# Patient Record
Sex: MALE | Race: WHITE | URBAN - METROPOLITAN AREA
[De-identification: names, ages, dates, MRNs, and addresses within clinical notes are randomized per-mention and may not be internally consistent; named-entity substitution may affect disease eponyms.]

---
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
## 2024-09-23 DIAGNOSIS — S66.811A STRAIN OF OTHER SPECIFIED MUSCLES, FASCIA AND TENDONS AT WRIST AND HAND LEVEL, RIGHT HAND, INITIAL ENCOUNTER: Primary | ICD-10-CM

## 2024-09-25 ENCOUNTER — OFFICE VISIT (OUTPATIENT)
Dept: ORTHOPEDICS | Facility: CLINIC | Age: 24
End: 2024-09-25
Payer: MEDICAID

## 2024-09-25 ENCOUNTER — HOSPITAL ENCOUNTER (OUTPATIENT)
Dept: RADIOLOGY | Facility: HOSPITAL | Age: 24
Discharge: HOME | End: 2024-09-25
Attending: ORTHOPAEDIC SURGERY
Payer: MEDICAID

## 2024-09-25 VITALS
HEIGHT: 67 IN | SYSTOLIC BLOOD PRESSURE: 108 MMHG | HEART RATE: 70 BPM | DIASTOLIC BLOOD PRESSURE: 66 MMHG | TEMPERATURE: 98 F

## 2024-09-25 DIAGNOSIS — M79.641 RIGHT HAND PAIN: ICD-10-CM

## 2024-09-25 DIAGNOSIS — S63.639A JERSEY FINGER, INITIAL ENCOUNTER: ICD-10-CM

## 2024-09-25 DIAGNOSIS — M79.641 RIGHT HAND PAIN: Primary | ICD-10-CM

## 2024-09-25 DIAGNOSIS — S66.811A STRAIN OF OTHER SPECIFIED MUSCLES, FASCIA AND TENDONS AT WRIST AND HAND LEVEL, RIGHT HAND, INITIAL ENCOUNTER: ICD-10-CM

## 2024-09-25 DIAGNOSIS — S66.194A OTHER INJURY OF FLEXOR MUSCLE, FASCIA AND TENDON OF RIGHT RING FINGER AT WRIST AND HAND LEVEL, INITIAL ENCOUNTER: ICD-10-CM

## 2024-09-25 PROCEDURE — 73130 X-RAY EXAM OF HAND: CPT | Mod: TC,RT

## 2024-09-25 PROCEDURE — 99214 OFFICE O/P EST MOD 30 MIN: CPT | Mod: PBBFAC,25

## 2024-09-25 RX ORDER — BLOOD-GLUCOSE TRANSMITTER
EACH MISCELLANEOUS
COMMUNITY

## 2024-09-25 RX ORDER — INSULIN GLARGINE 100 [IU]/ML
30 INJECTION, SOLUTION SUBCUTANEOUS NIGHTLY
COMMUNITY

## 2024-09-25 RX ORDER — IBUPROFEN 200 MG
16 TABLET ORAL DAILY PRN
COMMUNITY

## 2024-09-25 RX ORDER — BLOOD-GLUCOSE SENSOR
EACH MISCELLANEOUS
COMMUNITY

## 2024-09-25 RX ORDER — PEN NEEDLE, DIABETIC 30 GX3/16"
NEEDLE, DISPOSABLE MISCELLANEOUS
COMMUNITY
Start: 2024-07-16

## 2024-09-25 RX ORDER — INSULIN ASPART 100 [IU]/ML
INJECTION, SOLUTION INTRAVENOUS; SUBCUTANEOUS
COMMUNITY
Start: 2024-07-19

## 2024-09-25 RX ORDER — BLOOD-GLUCOSE METER
EACH MISCELLANEOUS
COMMUNITY
Start: 2024-07-17

## 2024-09-25 NOTE — PROGRESS NOTES
[facility] Orthopaedic Surgery Clinic Note    In brief, Vik Ray is a 24 y.o. 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  Patient reports during a football game he injured his right ring finger while trying to make a tackle.  He had immediate pain and inability to bend the DIP joint of his right ring finger.  He was seen by an outside physician who placed him into an ulnar gutter removable wrist brace which he has been in since the injury.  Today he has some stiffness in his ring finger.  Denies previous injury of the right hand.  No previous surgeries to the right hand.  Denies other significant medical history.  Does not use nicotine products.      PMH: No past medical history on file.    PSH: No past surgical history on file.    SH:   Social History     Socioeconomic History    Marital status: Unknown       FH: No family history on file.    Allergies: Review of patient's allergies indicates:  Not on File    ROS:  Constitutional- no fever, fatigue, weakness  Eye- no vision loss, eye redness, drainage, or pain  ENMT- no sore throat, ear pain, sinus pain/congestion, nasal congestion/drainage  Respiratory- no cough, wheezing, or shortness of breath  CV- no chest pain, no palpitations, no edema  GI- no N/V/D; no abdominal pain    Physical Exam:  There were no vitals filed for this visit.    General: NAD  Cardio: RRR by peripheral pulse  Pulm: Normal WOB on room air, symmetric chest rise  Abd: Soft, NT/ND    MSK:  Right hand:   No open wounds or superficial abrasions.  The right ring finger is held in extension.  He is not able to actively flex at the D IP joint.  He does have preserved active flexion at the PIP and MCP joint but decreased compared to the contralateral side due to stiffness from immobilization over the last couple of weeks.  Sensation intact to light touch on radial and ulnar aspect of the  ring finger.  Fingertips warm well perfused.  Some tenderness to palpation at the distal phalanx.    Imaging:   Right hand x-ray:  No obvious avulsion fracture or dislocation of the ring finger.    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    -stat MRI of the right hand ordered for surgical planning.  Patient will follow up with a hand specialist next week to discuss the results of the MRI and discuss further treatment options.  Instructed patient to work on range of motion of his finger as much as possible and discontinue splint wear.  Patient will follow up with our clinic PRN.    Homero García MD  [facility] Orthopaedic Surgery  9/25/2024 7:43 AM

## 2024-09-25 NOTE — PROGRESS NOTES
Faculty Attestation: Vik Ray  was seen at [facility] in the Orthopaedic Clinic. Patient seen and evaluated at the time of the visit. History of Present Illness, Physical Exam, and Assessment and Plan reviewed. Treatment plan is reasonable and appropriate. Compliance with treatment recommendations is important. No procedure was performed.     Hari Nolasco MD  Orthopaedic Surgery

## 2024-09-26 ENCOUNTER — HOSPITAL ENCOUNTER (OUTPATIENT)
Dept: RADIOLOGY | Facility: HOSPITAL | Age: 24
Discharge: HOME | End: 2024-09-26
Attending: STUDENT IN AN ORGANIZED HEALTH CARE EDUCATION/TRAINING PROGRAM
Payer: MEDICAID

## 2024-09-26 DIAGNOSIS — S66.194A OTHER INJURY OF FLEXOR MUSCLE, FASCIA AND TENDON OF RIGHT RING FINGER AT WRIST AND HAND LEVEL, INITIAL ENCOUNTER: ICD-10-CM

## 2024-09-26 DIAGNOSIS — S63.639A JERSEY FINGER, INITIAL ENCOUNTER: ICD-10-CM

## 2024-09-26 PROCEDURE — 73218 MRI UPPER EXTREMITY W/O DYE: CPT | Mod: TC,RT

## 2024-09-30 ENCOUNTER — PATIENT MESSAGE (OUTPATIENT)
Dept: RESPIRATORY THERAPY | Facility: HOSPITAL | Age: 24
End: 2024-09-30
Payer: MEDICAID

## 2024-09-30 ENCOUNTER — ANESTHESIA EVENT (OUTPATIENT)
Dept: SURGERY | Facility: HOSPITAL | Age: 24
End: 2024-09-30
Payer: MEDICAID

## 2024-09-30 ENCOUNTER — LAB VISIT (OUTPATIENT)
Dept: LAB | Facility: HOSPITAL | Age: 24
End: 2024-09-30
Attending: STUDENT IN AN ORGANIZED HEALTH CARE EDUCATION/TRAINING PROGRAM
Payer: MEDICAID

## 2024-09-30 ENCOUNTER — OFFICE VISIT (OUTPATIENT)
Dept: ORTHOPEDICS | Facility: CLINIC | Age: 24
End: 2024-09-30
Payer: MEDICAID

## 2024-09-30 ENCOUNTER — PATIENT MESSAGE (OUTPATIENT)
Dept: PREADMISSION TESTING | Facility: HOSPITAL | Age: 24
End: 2024-09-30
Payer: MEDICAID

## 2024-09-30 DIAGNOSIS — S63.639A JERSEY FINGER, INITIAL ENCOUNTER: ICD-10-CM

## 2024-09-30 DIAGNOSIS — S66.194A OTHER INJURY OF FLEXOR MUSCLE, FASCIA AND TENDON OF RIGHT RING FINGER AT WRIST AND HAND LEVEL, INITIAL ENCOUNTER: ICD-10-CM

## 2024-09-30 LAB
ALBUMIN SERPL BCP-MCNC: 4.5 G/DL (ref 3.5–5.2)
ALP SERPL-CCNC: 63 U/L (ref 55–135)
ALT SERPL W/O P-5'-P-CCNC: 17 U/L (ref 10–44)
ANION GAP SERPL CALC-SCNC: 9 MMOL/L (ref 8–16)
AST SERPL-CCNC: 18 U/L (ref 10–40)
BASOPHILS # BLD AUTO: 0.05 K/UL (ref 0–0.2)
BASOPHILS NFR BLD: 0.8 % (ref 0–1.9)
BILIRUB SERPL-MCNC: 1.8 MG/DL (ref 0.1–1)
BUN SERPL-MCNC: 17 MG/DL (ref 6–20)
CALCIUM SERPL-MCNC: 10.1 MG/DL (ref 8.7–10.5)
CHLORIDE SERPL-SCNC: 103 MMOL/L (ref 95–110)
CO2 SERPL-SCNC: 26 MMOL/L (ref 23–29)
CREAT SERPL-MCNC: 1.1 MG/DL (ref 0.5–1.4)
DIFFERENTIAL METHOD BLD: NORMAL
EOSINOPHIL # BLD AUTO: 0.2 K/UL (ref 0–0.5)
EOSINOPHIL NFR BLD: 2.5 % (ref 0–8)
ERYTHROCYTE [DISTWIDTH] IN BLOOD BY AUTOMATED COUNT: 12.5 % (ref 11.5–14.5)
EST. GFR  (NO RACE VARIABLE): >60 ML/MIN/1.73 M^2
ESTIMATED AVG GLUCOSE: 128 MG/DL (ref 68–131)
GLUCOSE SERPL-MCNC: 112 MG/DL (ref 70–110)
HBA1C MFR BLD: 6.1 % (ref 4–5.6)
HCT VFR BLD AUTO: 46.7 % (ref 40–54)
HGB BLD-MCNC: 15.2 G/DL (ref 14–18)
IMM GRANULOCYTES # BLD AUTO: 0.02 K/UL (ref 0–0.04)
IMM GRANULOCYTES NFR BLD AUTO: 0.3 % (ref 0–0.5)
LYMPHOCYTES # BLD AUTO: 1.7 K/UL (ref 1–4.8)
LYMPHOCYTES NFR BLD: 29.5 % (ref 18–48)
MCH RBC QN AUTO: 27.8 PG (ref 27–31)
MCHC RBC AUTO-ENTMCNC: 32.5 G/DL (ref 32–36)
MCV RBC AUTO: 85 FL (ref 82–98)
MONOCYTES # BLD AUTO: 0.6 K/UL (ref 0.3–1)
MONOCYTES NFR BLD: 9.3 % (ref 4–15)
NEUTROPHILS # BLD AUTO: 3.4 K/UL (ref 1.8–7.7)
NEUTROPHILS NFR BLD: 57.6 % (ref 38–73)
NRBC BLD-RTO: 0 /100 WBC
PLATELET # BLD AUTO: 307 K/UL (ref 150–450)
PMV BLD AUTO: 9.3 FL (ref 9.2–12.9)
POTASSIUM SERPL-SCNC: 4.4 MMOL/L (ref 3.5–5.1)
PROT SERPL-MCNC: 7.5 G/DL (ref 6–8.4)
RBC # BLD AUTO: 5.47 M/UL (ref 4.6–6.2)
SODIUM SERPL-SCNC: 138 MMOL/L (ref 136–145)
WBC # BLD AUTO: 5.89 K/UL (ref 3.9–12.7)

## 2024-09-30 PROCEDURE — 36415 COLL VENOUS BLD VENIPUNCTURE: CPT | Performed by: STUDENT IN AN ORGANIZED HEALTH CARE EDUCATION/TRAINING PROGRAM

## 2024-09-30 PROCEDURE — 85025 COMPLETE CBC W/AUTO DIFF WBC: CPT | Performed by: STUDENT IN AN ORGANIZED HEALTH CARE EDUCATION/TRAINING PROGRAM

## 2024-09-30 PROCEDURE — 99204 OFFICE O/P NEW MOD 45 MIN: CPT | Mod: S$PBB,,, | Performed by: STUDENT IN AN ORGANIZED HEALTH CARE EDUCATION/TRAINING PROGRAM

## 2024-09-30 PROCEDURE — 80053 COMPREHEN METABOLIC PANEL: CPT | Performed by: STUDENT IN AN ORGANIZED HEALTH CARE EDUCATION/TRAINING PROGRAM

## 2024-09-30 PROCEDURE — 83036 HEMOGLOBIN GLYCOSYLATED A1C: CPT | Performed by: STUDENT IN AN ORGANIZED HEALTH CARE EDUCATION/TRAINING PROGRAM

## 2024-09-30 PROCEDURE — 99999 PR PBB SHADOW E&M-EST. PATIENT-LVL IV: CPT | Mod: PBBFAC,,, | Performed by: STUDENT IN AN ORGANIZED HEALTH CARE EDUCATION/TRAINING PROGRAM

## 2024-09-30 PROCEDURE — 99214 OFFICE O/P EST MOD 30 MIN: CPT | Mod: PBBFAC | Performed by: STUDENT IN AN ORGANIZED HEALTH CARE EDUCATION/TRAINING PROGRAM

## 2024-09-30 NOTE — PROGRESS NOTES
Hand Surgery Clinic Note    Chief Complaint  Chief Complaint   Patient presents with    Right Hand - Injury     Right ring finger       History of Present Illness  24-year-old right-hand dominant male presents for evaluation of a right ring finger injury.  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  On 08/31/2024, he injured the right ring finger while playing football.  The finger was hyperextended.  Of note, he does have a history of type 1 diabetes on insulin.  He immediately noted that he was unable to flex at the DIPJ joint of the ring finger.  He was seen by an outside physician who placed him in an ulnar gutter wrist brace.  He was subsequently seen at the [facility] in [place] for this injury last week.  An MRI was ordered for further evaluate and patient was referred to my clinic for further treatment.      Review of Systems  Review of systems negative for chest pain, shortness of breath, fevers, chills, nausea/vomiting.    Past Medical History  Past Medical History:   Diagnosis Date    Diabetes mellitus type 1        Past Surgical History  No past surgical history on file.    Allergies  Review of patient's allergies indicates:   Allergen Reactions    Penicillins Hives, Itching, Rash, Shortness Of Breath and Swelling       Family History  No family history on file.    Social History  Social History     Socioeconomic History    Marital status: Unknown   Tobacco Use    Smoking status: Never    Smokeless tobacco: Never   Substance and Sexual Activity    Alcohol use: Never    Drug use: Never       Vital Signs  There were no vitals filed for this visit.    Physical Exam  Constitutional: Appears well-developed and well-nourished. No distress.   HENT:   Head: Normocephalic.   Eyes: EOM are normal.   Pulmonary/Chest: Effort normal.   Neurological: Oriented to person, place, and time.   Psychiatric: Normal mood and affect.      Right Upper Extremity:  No abrasions, lacerations, wounds.  No swelling.  Full active ring finger MCP motion is 0-90 degrees.  Index finger PIP active motion is 0-80 degrees and passive motion is 0-70 degrees.  Patient has no active flexion at the ring finger DIPJ joint; patient can actively extend at the DIPJ joint.  DIPJ passive motion is 0-50 degrees.  Two-point discrimination is 5 mm in the radial and ulnar aspects of the ring finger.  Index finger is warm with brisk capillary refill.  Sensation is intact in the median, radial, ulnar nerve distributions.    Imaging  Right-hand x-rays three views were obtained today and independently reviewed by myself.  No fracture.  No dislocation or subluxation.  No foreign body.  No arthritic changes noted throughout the hand.    MRI of the right-hand without contrast was obtained on 09/26/2024 and independently reviewed by myself.  There is noted to be complete rupture of the ring finger FDP tendon with retraction to the level of the PIPJ joint.  Ring finger FDS tendon is intact.    Assessment and Plan  24-year-old right-hand dominant male sustained a Jersey finger injury with complete rupture of the right ring finger FDS tendon on 08/31/2024, 4 weeks and 2 days ago.  I had a long discussion with the patient regarding potential treatment options.  I discussed possible nonoperative treatment with physical therapy.  I discussed that patient may note some decreased  strength as a result of this but would likely still be able to function and do most of the things that he wants to do.  I discussed potential operative treatment including primary repair of the flexor tendon.  I discussed that this is typically done acutely and patient was now several weeks out which makes the procedure much more difficult.  It was very possible that during surgery that the tendon has retracted and scarred in such that it will no longer be able to be approximated back to its insertion on the  distal phalanx.  I discussed that given he was a young healthy individual who is very active, I would recommend an attempt at zone 1 flexor tendon repair.  I discussed that he needs to be aware that there is a chance that during surgery I will not be able to reapproximate the tendon because he was too far out from injury and the tendon has now scarred in.  If this occurs, I discussed with him that his potential treatment options are to live with an FDS only finger versus perform a reconstruction procedure.  I discussed that reconstruction procedure will be a 2-3 year process that which will involve at least 1 more surgery if not 2 as well as extensive therapy.  As such, the patient elected that if I am unable to perform a primary repair, we should forego reconstruction.  Patient was booked for surgery tomorrow.  Patient was consented for right ring finger flexor digitorum profundus tendon repair and tenolysis.  Consent was obtained.  Discussed increased risk of infection given history of type 1 diabetes.  We will work on setting up postoperative hand therapy for the patient.  He lives in [place], [place].    Lakeisha Nicholson MD  Orthopaedic Hand Surgery

## 2024-09-30 NOTE — ANESTHESIA PREPROCEDURE EVALUATION
09/30/2024  Vik Ray is a 24 y.o., male.  Past Medical History:   Diagnosis Date    Diabetes mellitus type 1      No past surgical history on file.      Pre-op Assessment    I have reviewed the Patient Summary Reports.     I have reviewed the Nursing Notes. I have reviewed the NPO Status.   I have reviewed the Medications.     Review of Systems  Anesthesia Hx:  No problems with previous Anesthesia   Neg history of prior surgery.          Denies Family Hx of Anesthesia complications.    Denies Personal Hx of Anesthesia complications.                    Social:  Non-Smoker       Hematology/Oncology:  Hematology Normal                                     Cardiovascular:  Cardiovascular Normal                                            Pulmonary:  Pulmonary Normal                       Renal/:  Renal/ Normal                 Hepatic/GI:  Hepatic/GI Normal                 Neurological:  Neurology Normal                                      Endocrine:  Diabetes, type 1           Psych:  Psychiatric Normal                    Physical Exam  General: Alert and Oriented    Airway:  Mallampati: II   Mouth Opening: Normal  TM Distance: Normal  Tongue: Normal  Neck ROM: Normal ROM    Dental:  Intact    Chest/Lungs:  Clear to auscultation, Normal Respiratory Rate    Heart:  Rate: Normal  Rhythm: Regular Rhythm        Anesthesia Plan  Type of Anesthesia, risks & benefits discussed:    Anesthesia Type: Gen ETT, Gen Supraglottic Airway  Intra-op Monitoring Plan: Standard ASA Monitors  Post Op Pain Control Plan: multimodal analgesia and IV/PO Opioids PRN  Induction:  IV  Informed Consent: Informed consent signed with the Patient and all parties understand the risks and agree with anesthesia plan.  All questions answered.   ASA Score: 2  Day of Surgery Review of History & Physical: H&P Update referred to the  surgeon/provider.    Ready For Surgery From Anesthesia Perspective.     .

## 2024-09-30 NOTE — H&P (VIEW-ONLY)
Hand Surgery Clinic Note    Chief Complaint  Chief Complaint   Patient presents with    Right Hand - Injury     Right ring finger       History of Present Illness  24-year-old right-hand dominant male presents for evaluation of a right ring finger injury.  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  On 08/31/2024, he injured the right ring finger while playing football.  The finger was hyperextended.  Of note, he does have a history of type 1 diabetes on insulin.  He immediately noted that he was unable to flex at the DIPJ joint of the ring finger.  He was seen by an outside physician who placed him in an ulnar gutter wrist brace.  He was subsequently seen at the [facility] in [place] for this injury last week.  An MRI was ordered for further evaluate and patient was referred to my clinic for further treatment.      Review of Systems  Review of systems negative for chest pain, shortness of breath, fevers, chills, nausea/vomiting.    Past Medical History  Past Medical History:   Diagnosis Date    Diabetes mellitus type 1        Past Surgical History  No past surgical history on file.    Allergies  Review of patient's allergies indicates:   Allergen Reactions    Penicillins Hives, Itching, Rash, Shortness Of Breath and Swelling       Family History  No family history on file.    Social History  Social History     Socioeconomic History    Marital status: Unknown   Tobacco Use    Smoking status: Never    Smokeless tobacco: Never   Substance and Sexual Activity    Alcohol use: Never    Drug use: Never       Vital Signs  There were no vitals filed for this visit.    Physical Exam  Constitutional: Appears well-developed and well-nourished. No distress.   HENT:   Head: Normocephalic.   Eyes: EOM are normal.   Pulmonary/Chest: Effort normal.   Neurological: Oriented to person, place, and time.   Psychiatric: Normal mood and affect.      Right Upper Extremity:  No abrasions, lacerations, wounds.  No swelling.  Full active ring finger MCP motion is 0-90 degrees.  Index finger PIP active motion is 0-80 degrees and passive motion is 0-70 degrees.  Patient has no active flexion at the ring finger DIPJ joint; patient can actively extend at the DIPJ joint.  DIPJ passive motion is 0-50 degrees.  Two-point discrimination is 5 mm in the radial and ulnar aspects of the ring finger.  Index finger is warm with brisk capillary refill.  Sensation is intact in the median, radial, ulnar nerve distributions.    Imaging  Right-hand x-rays three views were obtained today and independently reviewed by myself.  No fracture.  No dislocation or subluxation.  No foreign body.  No arthritic changes noted throughout the hand.    MRI of the right-hand without contrast was obtained on 09/26/2024 and independently reviewed by myself.  There is noted to be complete rupture of the ring finger FDP tendon with retraction to the level of the PIPJ joint.  Ring finger FDS tendon is intact.    Assessment and Plan  24-year-old right-hand dominant male sustained a Jersey finger injury with complete rupture of the right ring finger FDS tendon on 08/31/2024, 4 weeks and 2 days ago.  I had a long discussion with the patient regarding potential treatment options.  I discussed possible nonoperative treatment with physical therapy.  I discussed that patient may note some decreased  strength as a result of this but would likely still be able to function and do most of the things that he wants to do.  I discussed potential operative treatment including primary repair of the flexor tendon.  I discussed that this is typically done acutely and patient was now several weeks out which makes the procedure much more difficult.  It was very possible that during surgery that the tendon has retracted and scarred in such that it will no longer be able to be approximated back to its insertion on the  distal phalanx.  I discussed that given he was a young healthy individual who is very active, I would recommend an attempt at zone 1 flexor tendon repair.  I discussed that he needs to be aware that there is a chance that during surgery I will not be able to reapproximate the tendon because he was too far out from injury and the tendon has now scarred in.  If this occurs, I discussed with him that his potential treatment options are to live with an FDS only finger versus perform a reconstruction procedure.  I discussed that reconstruction procedure will be a 2-3 year process that which will involve at least 1 more surgery if not 2 as well as extensive therapy.  As such, the patient elected that if I am unable to perform a primary repair, we should forego reconstruction.  Patient was booked for surgery tomorrow.  Patient was consented for right ring finger flexor digitorum profundus tendon repair and tenolysis.  Consent was obtained.  Discussed increased risk of infection given history of type 1 diabetes.  We will work on setting up postoperative hand therapy for the patient.  He lives in [place], [place].    Lakeisha Nicholson MD  Orthopaedic Hand Surgery

## 2024-10-01 ENCOUNTER — HOSPITAL ENCOUNTER (OUTPATIENT)
Facility: HOSPITAL | Age: 24
Discharge: HOME | End: 2024-10-01
Attending: STUDENT IN AN ORGANIZED HEALTH CARE EDUCATION/TRAINING PROGRAM | Admitting: STUDENT IN AN ORGANIZED HEALTH CARE EDUCATION/TRAINING PROGRAM
Payer: MEDICAID

## 2024-10-01 ENCOUNTER — HOSPITAL ENCOUNTER (OUTPATIENT)
Dept: RADIOLOGY | Facility: HOSPITAL | Age: 24
Discharge: HOME | End: 2024-10-01
Attending: STUDENT IN AN ORGANIZED HEALTH CARE EDUCATION/TRAINING PROGRAM | Admitting: STUDENT IN AN ORGANIZED HEALTH CARE EDUCATION/TRAINING PROGRAM
Payer: MEDICAID

## 2024-10-01 ENCOUNTER — ANESTHESIA (OUTPATIENT)
Dept: SURGERY | Facility: HOSPITAL | Age: 24
End: 2024-10-01
Payer: MEDICAID

## 2024-10-01 VITALS
HEART RATE: 67 BPM | BODY MASS INDEX: 26.85 KG/M2 | TEMPERATURE: 98 F | RESPIRATION RATE: 16 BRPM | DIASTOLIC BLOOD PRESSURE: 61 MMHG | OXYGEN SATURATION: 97 % | SYSTOLIC BLOOD PRESSURE: 137 MMHG | WEIGHT: 171.06 LBS | HEIGHT: 67 IN

## 2024-10-01 DIAGNOSIS — S63.639A TRAUMATIC RUPTURE OF TENDON OF DISTAL INTERPHALANGEAL (DIP) JOINT OF FINGER, INITIAL ENCOUNTER: Primary | ICD-10-CM

## 2024-10-01 DIAGNOSIS — Z98.890 POSTOPERATIVE STATE: ICD-10-CM

## 2024-10-01 LAB
POCT GLUCOSE: 105 MG/DL (ref 70–110)
POCT GLUCOSE: 149 MG/DL (ref 70–110)
POCT GLUCOSE: 67 MG/DL (ref 70–110)

## 2024-10-01 PROCEDURE — 71000033 HC RECOVERY, INTIAL HOUR: Performed by: STUDENT IN AN ORGANIZED HEALTH CARE EDUCATION/TRAINING PROGRAM

## 2024-10-01 PROCEDURE — 26350 REPAIR FINGER/HAND TENDON: CPT | Mod: F8,,, | Performed by: STUDENT IN AN ORGANIZED HEALTH CARE EDUCATION/TRAINING PROGRAM

## 2024-10-01 PROCEDURE — 73120 X-RAY EXAM OF HAND: CPT | Mod: TC,RT

## 2024-10-01 PROCEDURE — 37000009 HC ANESTHESIA EA ADD 15 MINS: Performed by: STUDENT IN AN ORGANIZED HEALTH CARE EDUCATION/TRAINING PROGRAM

## 2024-10-01 PROCEDURE — 63600175 PHARM REV CODE 636 W HCPCS: Performed by: ANESTHESIOLOGY

## 2024-10-01 PROCEDURE — 25000003 PHARM REV CODE 250: Performed by: ANESTHESIOLOGY

## 2024-10-01 PROCEDURE — 25000003 PHARM REV CODE 250: Performed by: NURSE ANESTHETIST, CERTIFIED REGISTERED

## 2024-10-01 PROCEDURE — C1713 ANCHOR/SCREW BN/BN,TIS/BN: HCPCS | Performed by: STUDENT IN AN ORGANIZED HEALTH CARE EDUCATION/TRAINING PROGRAM

## 2024-10-01 PROCEDURE — 36000707: Performed by: STUDENT IN AN ORGANIZED HEALTH CARE EDUCATION/TRAINING PROGRAM

## 2024-10-01 PROCEDURE — 36000706: Performed by: STUDENT IN AN ORGANIZED HEALTH CARE EDUCATION/TRAINING PROGRAM

## 2024-10-01 PROCEDURE — 26440 RELEASE PALM/FINGER TENDON: CPT | Mod: 51,F8,, | Performed by: STUDENT IN AN ORGANIZED HEALTH CARE EDUCATION/TRAINING PROGRAM

## 2024-10-01 PROCEDURE — 71000015 HC POSTOP RECOV 1ST HR: Performed by: STUDENT IN AN ORGANIZED HEALTH CARE EDUCATION/TRAINING PROGRAM

## 2024-10-01 PROCEDURE — 63600175 PHARM REV CODE 636 W HCPCS: Mod: JZ,JG | Performed by: STUDENT IN AN ORGANIZED HEALTH CARE EDUCATION/TRAINING PROGRAM

## 2024-10-01 PROCEDURE — 71000016 HC POSTOP RECOV ADDL HR: Performed by: STUDENT IN AN ORGANIZED HEALTH CARE EDUCATION/TRAINING PROGRAM

## 2024-10-01 PROCEDURE — 82962 GLUCOSE BLOOD TEST: CPT | Performed by: STUDENT IN AN ORGANIZED HEALTH CARE EDUCATION/TRAINING PROGRAM

## 2024-10-01 PROCEDURE — 37000008 HC ANESTHESIA 1ST 15 MINUTES: Performed by: STUDENT IN AN ORGANIZED HEALTH CARE EDUCATION/TRAINING PROGRAM

## 2024-10-01 PROCEDURE — 25000003 PHARM REV CODE 250: Performed by: STUDENT IN AN ORGANIZED HEALTH CARE EDUCATION/TRAINING PROGRAM

## 2024-10-01 PROCEDURE — 63600175 PHARM REV CODE 636 W HCPCS: Performed by: NURSE ANESTHETIST, CERTIFIED REGISTERED

## 2024-10-01 DEVICE — ANCHOR SUT 3-0 FW KWIRE 1.35MM: Type: IMPLANTABLE DEVICE | Site: FINGER | Status: FUNCTIONAL

## 2024-10-01 RX ORDER — ROCURONIUM BROMIDE 10 MG/ML
INJECTION, SOLUTION INTRAVENOUS
Status: DISCONTINUED | OUTPATIENT
Start: 2024-10-01 | End: 2024-10-01

## 2024-10-01 RX ORDER — FENTANYL CITRATE 50 UG/ML
25 INJECTION, SOLUTION INTRAMUSCULAR; INTRAVENOUS EVERY 5 MIN PRN
Status: DISCONTINUED | OUTPATIENT
Start: 2024-10-01 | End: 2024-10-01 | Stop reason: HOSPADM

## 2024-10-01 RX ORDER — HYDROCODONE BITARTRATE AND ACETAMINOPHEN 5; 325 MG/1; MG/1
1 TABLET ORAL
Status: DISCONTINUED | OUTPATIENT
Start: 2024-10-01 | End: 2024-10-01 | Stop reason: HOSPADM

## 2024-10-01 RX ORDER — SODIUM CHLORIDE, SODIUM LACTATE, POTASSIUM CHLORIDE, CALCIUM CHLORIDE 600; 310; 30; 20 MG/100ML; MG/100ML; MG/100ML; MG/100ML
INJECTION, SOLUTION INTRAVENOUS CONTINUOUS
Status: DISCONTINUED | OUTPATIENT
Start: 2024-10-01 | End: 2024-10-01 | Stop reason: HOSPADM

## 2024-10-01 RX ORDER — LIDOCAINE HYDROCHLORIDE 10 MG/ML
INJECTION, SOLUTION EPIDURAL; INFILTRATION; INTRACAUDAL; PERINEURAL
Status: DISCONTINUED | OUTPATIENT
Start: 2024-10-01 | End: 2024-10-01 | Stop reason: HOSPADM

## 2024-10-01 RX ORDER — LIDOCAINE HYDROCHLORIDE 20 MG/ML
INJECTION, SOLUTION EPIDURAL; INFILTRATION; INTRACAUDAL; PERINEURAL
Status: DISCONTINUED | OUTPATIENT
Start: 2024-10-01 | End: 2024-10-01

## 2024-10-01 RX ORDER — HYDROCODONE BITARTRATE AND ACETAMINOPHEN 5; 325 MG/1; MG/1
1 TABLET ORAL EVERY 6 HOURS PRN
Qty: 15 TABLET | Refills: 0 | Status: SHIPPED | OUTPATIENT
Start: 2024-10-01 | End: 2024-10-08

## 2024-10-01 RX ORDER — DIPHENHYDRAMINE HYDROCHLORIDE 50 MG/ML
INJECTION INTRAMUSCULAR; INTRAVENOUS
Status: DISCONTINUED | OUTPATIENT
Start: 2024-10-01 | End: 2024-10-01

## 2024-10-01 RX ORDER — TRAMADOL HYDROCHLORIDE 50 MG/1
50 TABLET ORAL EVERY 8 HOURS PRN
Qty: 10 TABLET | Refills: 0 | Status: SHIPPED | OUTPATIENT
Start: 2024-10-01

## 2024-10-01 RX ORDER — ONDANSETRON HYDROCHLORIDE 2 MG/ML
4 INJECTION, SOLUTION INTRAVENOUS ONCE AS NEEDED
Status: DISCONTINUED | OUTPATIENT
Start: 2024-10-01 | End: 2024-10-01 | Stop reason: HOSPADM

## 2024-10-01 RX ORDER — LIDOCAINE HYDROCHLORIDE 10 MG/ML
INJECTION, SOLUTION EPIDURAL; INFILTRATION; INTRACAUDAL; PERINEURAL
Status: DISCONTINUED
Start: 2024-10-01 | End: 2024-10-01 | Stop reason: HOSPADM

## 2024-10-01 RX ORDER — BACITRACIN ZINC 500 UNIT/G
OINTMENT (GRAM) TOPICAL
Status: DISCONTINUED | OUTPATIENT
Start: 2024-10-01 | End: 2024-10-01 | Stop reason: HOSPADM

## 2024-10-01 RX ORDER — GLUCAGON 1 MG
1 KIT INJECTION
Status: DISCONTINUED | OUTPATIENT
Start: 2024-10-01 | End: 2024-10-01 | Stop reason: HOSPADM

## 2024-10-01 RX ORDER — CLINDAMYCIN PHOSPHATE 900 MG/50ML
INJECTION, SOLUTION INTRAVENOUS
Status: COMPLETED
Start: 2024-10-01 | End: 2024-10-01

## 2024-10-01 RX ORDER — KETAMINE HYDROCHLORIDE 50 MG/ML
INJECTION, SOLUTION INTRAMUSCULAR; INTRAVENOUS
Status: DISCONTINUED | OUTPATIENT
Start: 2024-10-01 | End: 2024-10-01

## 2024-10-01 RX ORDER — ALBUTEROL SULFATE 0.83 MG/ML
2.5 SOLUTION RESPIRATORY (INHALATION) EVERY 4 HOURS PRN
Status: DISCONTINUED | OUTPATIENT
Start: 2024-10-01 | End: 2024-10-01 | Stop reason: HOSPADM

## 2024-10-01 RX ORDER — PROPOFOL 10 MG/ML
VIAL (ML) INTRAVENOUS
Status: DISCONTINUED | OUTPATIENT
Start: 2024-10-01 | End: 2024-10-01

## 2024-10-01 RX ORDER — HYDROMORPHONE HYDROCHLORIDE 2 MG/ML
INJECTION, SOLUTION INTRAMUSCULAR; INTRAVENOUS; SUBCUTANEOUS
Status: DISCONTINUED | OUTPATIENT
Start: 2024-10-01 | End: 2024-10-01

## 2024-10-01 RX ORDER — DIPHENHYDRAMINE HYDROCHLORIDE 50 MG/ML
25 INJECTION INTRAMUSCULAR; INTRAVENOUS EVERY 6 HOURS PRN
Status: DISCONTINUED | OUTPATIENT
Start: 2024-10-01 | End: 2024-10-01 | Stop reason: HOSPADM

## 2024-10-01 RX ORDER — CLINDAMYCIN PHOSPHATE 900 MG/50ML
INJECTION, SOLUTION INTRAVENOUS
Status: DISCONTINUED | OUTPATIENT
Start: 2024-10-01 | End: 2024-10-01

## 2024-10-01 RX ORDER — MEPERIDINE HYDROCHLORIDE 25 MG/ML
12.5 INJECTION INTRAMUSCULAR; INTRAVENOUS; SUBCUTANEOUS ONCE
Status: DISCONTINUED | OUTPATIENT
Start: 2024-10-01 | End: 2024-10-01 | Stop reason: HOSPADM

## 2024-10-01 RX ORDER — BUPIVACAINE HYDROCHLORIDE 2.5 MG/ML
INJECTION, SOLUTION EPIDURAL; INFILTRATION; INTRACAUDAL
Status: DISCONTINUED | OUTPATIENT
Start: 2024-10-01 | End: 2024-10-01 | Stop reason: HOSPADM

## 2024-10-01 RX ORDER — ONDANSETRON HYDROCHLORIDE 2 MG/ML
INJECTION, SOLUTION INTRAMUSCULAR; INTRAVENOUS
Status: DISCONTINUED | OUTPATIENT
Start: 2024-10-01 | End: 2024-10-01

## 2024-10-01 RX ORDER — MIDAZOLAM HYDROCHLORIDE 1 MG/ML
INJECTION INTRAMUSCULAR; INTRAVENOUS
Status: DISCONTINUED | OUTPATIENT
Start: 2024-10-01 | End: 2024-10-01

## 2024-10-01 RX ORDER — BUPIVACAINE HYDROCHLORIDE 2.5 MG/ML
INJECTION, SOLUTION EPIDURAL; INFILTRATION; INTRACAUDAL
Status: DISCONTINUED
Start: 2024-10-01 | End: 2024-10-01 | Stop reason: HOSPADM

## 2024-10-01 RX ORDER — FENTANYL CITRATE 50 UG/ML
INJECTION, SOLUTION INTRAMUSCULAR; INTRAVENOUS
Status: DISCONTINUED | OUTPATIENT
Start: 2024-10-01 | End: 2024-10-01

## 2024-10-01 RX ORDER — BACITRACIN ZINC 500 [USP'U]/G
OINTMENT TOPICAL
Status: DISCONTINUED
Start: 2024-10-01 | End: 2024-10-01 | Stop reason: HOSPADM

## 2024-10-01 RX ORDER — NAPROXEN 500 MG/1
500 TABLET ORAL EVERY 8 HOURS PRN
Qty: 50 TABLET | Refills: 0 | Status: SHIPPED | OUTPATIENT
Start: 2024-10-01

## 2024-10-01 RX ADMIN — HYDROCODONE BITARTRATE AND ACETAMINOPHEN 1 TABLET: 5; 325 TABLET ORAL at 12:10

## 2024-10-01 RX ADMIN — DEXTROSE 125 ML: 10 SOLUTION INTRAVENOUS at 09:10

## 2024-10-01 RX ADMIN — SODIUM CHLORIDE, POTASSIUM CHLORIDE, SODIUM LACTATE AND CALCIUM CHLORIDE: 600; 310; 30; 20 INJECTION, SOLUTION INTRAVENOUS at 09:10

## 2024-10-01 RX ADMIN — MIDAZOLAM 2 MG: 1 INJECTION INTRAMUSCULAR; INTRAVENOUS at 06:10

## 2024-10-01 RX ADMIN — SODIUM CHLORIDE, POTASSIUM CHLORIDE, SODIUM LACTATE AND CALCIUM CHLORIDE: 600; 310; 30; 20 INJECTION, SOLUTION INTRAVENOUS at 06:10

## 2024-10-01 RX ADMIN — HYDROMORPHONE HYDROCHLORIDE 0.2 MG: 2 INJECTION INTRAMUSCULAR; INTRAVENOUS; SUBCUTANEOUS at 08:10

## 2024-10-01 RX ADMIN — FENTANYL CITRATE 100 MCG: 0.05 INJECTION, SOLUTION INTRAMUSCULAR; INTRAVENOUS at 07:10

## 2024-10-01 RX ADMIN — HYDROMORPHONE HYDROCHLORIDE 0.4 MG: 2 INJECTION INTRAMUSCULAR; INTRAVENOUS; SUBCUTANEOUS at 09:10

## 2024-10-01 RX ADMIN — DIPHENHYDRAMINE HYDROCHLORIDE 12.5 MG: 50 INJECTION INTRAMUSCULAR; INTRAVENOUS at 07:10

## 2024-10-01 RX ADMIN — HYDROMORPHONE HYDROCHLORIDE 0.6 MG: 2 INJECTION INTRAMUSCULAR; INTRAVENOUS; SUBCUTANEOUS at 08:10

## 2024-10-01 RX ADMIN — KETAMINE HYDROCHLORIDE 30 MG: 50 INJECTION, SOLUTION INTRAMUSCULAR; INTRAVENOUS at 07:10

## 2024-10-01 RX ADMIN — LIDOCAINE HYDROCHLORIDE 50 MG: 20 INJECTION, SOLUTION EPIDURAL; INFILTRATION; INTRACAUDAL; PERINEURAL at 07:10

## 2024-10-01 RX ADMIN — ONDANSETRON 8 MG: 2 INJECTION INTRAMUSCULAR; INTRAVENOUS at 08:10

## 2024-10-01 RX ADMIN — ROCURONIUM BROMIDE 10 MG: 10 SOLUTION INTRAVENOUS at 08:10

## 2024-10-01 RX ADMIN — CLINDAMYCIN PHOSPHATE 900 MG: 900 INJECTION, SOLUTION INTRAVENOUS at 07:10

## 2024-10-01 RX ADMIN — ROCURONIUM BROMIDE 40 MG: 10 SOLUTION INTRAVENOUS at 07:10

## 2024-10-01 RX ADMIN — PROPOFOL 200 MG: 10 INJECTION, EMULSION INTRAVENOUS at 07:10

## 2024-10-01 NOTE — CARE UPDATE
Patient received from Julieta BREWER, PACU. Patient resting with eyes closed but does respond to voice commands, RR even and unlabored, VSS. Fluids infusing to 20g IV to left hand, site WDL. HOB elevated 45*. Ace bandage noted to right arm. Patient request to have family brought back at this time.

## 2024-10-01 NOTE — PLAN OF CARE
Patient moved to [facility] room 5. Report to OSMAN Keith, care assumed. Patient's belongings at bedside.

## 2024-10-01 NOTE — CARE UPDATE
Patient getting dressed with assist by family. NAD noted, did medicate for pain of 5/10 just prior to patient leaving facility.

## 2024-10-01 NOTE — ANESTHESIA PROCEDURE NOTES
Intubation    Date/Time: 10/1/2024 7:06 AM    Performed by: Lena Ya CRNA  Authorized by: Fatou Tipton MD    Intubation:     Induction:  Intravenous    Intubated:  Postinduction    Mask Ventilation:  Easy mask    Attempts:  1    Attempted By:  CRNA    Method of Intubation:  Video laryngoscopy    Blade:  Reynaga 3    Laryngeal View Grade: Grade I - full view of cords      Difficult Airway Encountered?: No      Complications:  None    Airway Device:  Oral endotracheal tube    Airway Device Size:  7.0    Style/Cuff Inflation:  Cuffed    Inflation Amount (mL):  5    Tube secured:  22    Secured at:  The lips    Placement Verified By:  Capnometry and Revisualization with laryngoscopy (Bilateral Breath Sounds)    Complicating Factors:  None    Findings Post-Intubation:  BS equal bilateral and atraumatic/condition of teeth unchanged

## 2024-10-01 NOTE — TRANSFER OF CARE
"Anesthesia Transfer of Care Note    Patient: Vik Ray    Procedure(s) Performed: Procedure(s) (LRB):  REPAIR, TENDON, FLEXOR (Right)    Patient location: PACU    Anesthesia Type: general    Transport from OR: Transported from OR on room air with adequate spontaneous ventilation    Post pain: adequate analgesia    Post assessment: no apparent anesthetic complications    Post vital signs: stable    Level of consciousness: sedated and responds to stimulation    Nausea/Vomiting: no nausea/vomiting    Complications: none    Transfer of care protocol was followed      Last vitals: Visit Vitals  BP (!) 153/89 (BP Location: Right arm, Patient Position: Sitting)   Pulse 61   Temp 36.5 °C (97.7 °F) (Temporal)   Resp 18   Ht 5' 7" (1.702 m)   Wt 77.6 kg (171 lb 1.2 oz)   SpO2 99%   BMI 26.79 kg/m²     "

## 2024-10-01 NOTE — ANESTHESIA POSTPROCEDURE EVALUATION
Anesthesia Post Evaluation    Patient: Vik Ray    Procedure(s) Performed: Procedure(s) (LRB):  REPAIR, TENDON, FLEXOR (Right)    Final Anesthesia Type: general      Patient location during evaluation: PACU  Patient participation: Yes- Able to Participate  Level of consciousness: awake and alert and oriented  Post-procedure vital signs: reviewed and stable  Pain management: adequate  Airway patency: patent    PONV status at discharge: No PONV  Anesthetic complications: no      Cardiovascular status: blood pressure returned to baseline, stable and hemodynamically stable  Respiratory status: unassisted  Hydration status: euvolemic  Follow-up not needed.              Vitals Value Taken Time   /56 10/01/24 0950   Temp 36.9 °C (98.4 °F) 10/01/24 0924   Pulse 53 10/01/24 0954   Resp 9 10/01/24 0954   SpO2 100 % 10/01/24 0954   Vitals shown include unfiled device data.      No case tracking events are documented in the log.      Pain/Jaime Score: Jaime Score: 4 (10/1/2024  9:24 AM)

## 2024-10-01 NOTE — DISCHARGE INSTRUCTIONS
After Hand Surgery  After surgery, the better you take care of yourself--especially your hand--the sooner it will heal. Follow your surgeons instructions. Try not to bump your hand, and dont move or lift anything while youre still wearing bandages, a splint, or a cast.  Care for your hand    Keep your hand elevated above heart level as much as possible for the first several days after surgery. This helps reduce swelling and pain.  To help prevent infection and speed healing, take care not to get your cast or bandages wet.  Keep dressing clean, dry, & intact until your follow up appointment.   Relieve pain as directed  Your surgeon may prescribe pain medicine or suggest you take an anti-inflammatory medicine. You might also be instructed to apply ice (or another cold source) to your hand. If you use ice cubes, put them in a plastic bag and rest it on top of your bandages. Leave the cold source on your hand for as long as its comfortable. Do this several times a day for the first few days after surgery. It may take several minutes before you can feel the cold through the cast or bandages.  Follow up with your surgeon  During a follow-up visit after surgery, your surgeon will check your progress. The stitches, bandages, splint, or cast may be removed. A new cast or splint may be placed. If your hand has healed enough, your surgeon may prescribe exercises.  Do prescribed hand exercises  Your surgeon may recommend that you do exercises. These may be done under the guidance of a physical or occupational therapist. The exercises strengthen your hand, help you regain flexibility, and restore proper function. Do the exercises as advised.  Call your surgeon if you have...  A fever higher than 100.4°F (38.0°C) taken by mouth  Side effects from your medicine, such as prolonged nausea  A wet or loose dressing, or a dressing that is too tight  Excessive bleeding  Increased, ongoing pain or numbness  Signs of infection (such  as drainage, warmth, or redness) at the incision site   Date Last Reviewed: 11/11/2015  © 2258-4638 [employer]. [address] [place] [address], [place] [address]. All rights reserved. This information is not intended as a substitute for professional medical care. Always follow your healthcare professional's instructions.

## 2024-10-01 NOTE — INTERVAL H&P NOTE
The patient has been examined and the H&P has been reviewed:    I concur with the findings and no changes have occurred since H&P was written.    Surgery risks, benefits and alternative options discussed and understood by patient/family.      Lakeisha Nicholson MD  Orthopaedic Hand Surgery

## 2024-10-01 NOTE — DISCHARGE SUMMARY
[facility]  Discharge Note  Short Stay    Procedure(s) (LRB):  REPAIR, TENDON, FLEXOR (Right)      OUTCOME: Patient tolerated treatment/procedure well without complication and is now ready for discharge.    DISPOSITION: Home or Self Care    FINAL DIAGNOSIS:  Zone 1 flexor tendon injury    FOLLOWUP: In clinic    DISCHARGE INSTRUCTIONS:    Discharge Procedure Orders   Diet Adult Regular     Leave dressing on - Keep it clean, dry, and intact until clinic visit   Order Comments: Keep dressing intact until removed by therapy.     Weight bearing restrictions (specify):   Order Comments: Nonweightbearing to the right upper extremity. Keep dressing clean and dry.        TIME SPENT ON DISCHARGE: 5 minutes

## 2024-10-01 NOTE — OP NOTE
[facility]  Orthopaedic Hand Surgery  Operative Note    SUMMARY     Date of Procedure: 10/1/2024     Procedure:   78992 Zone 1 flexor tendon repair right ring finger  94289 Tenolysis right ring finger FDP tendon    Surgeons and Role:     * Lakeisha Nicholson MD - Primary    Assistant: None    Pre-Operative Diagnosis: [place] finger right ring finger [S63.639A]    Post-Operative Diagnosis: Same    Anesthesia: General, Local    Indication for Procedure:  24-year-old right-hand dominant male presented to clinic yesterday with a right ring finger injury. 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  He injured the right ring finger while playing football on 08/31/2024, 4 weeks and 3 days ago.  An MRI was obtained last week which showed a zone 1 flexor digitorum profundus rupture with retraction to the level of the PIPJ joint.  Risks and benefits of operative versus nonoperative treatment were discussed with the patient.  Patient elected to proceed with surgery and consent was obtained.    Operative Findings (including complications, if any):  Rupture of the FDP tendon in his at its insertion on the distal phalanx with retraction to the level of the PIPJ joint.  Adhesion formation at the level of A2 as a result of the subacute nature of injury.    Description of Technical Procedures:   The patient was brought to the operating room and laid supine.  A tourniquet was placed at the right upper arm and the arm prepped with alcohol/chloraprep and draped in the usual sterile surgical fashion.  Preoperative time out was performed with confirmation of correct patient, side, and site, identification of all personnel, confirmation of administration of preoperative antibiotic, dry prep, and confirmation of all needed equipment.  When this was completed, I proceeded with the surgery.     Tourniquet was let up to 250mm Hg.  A mid axial  incision was made at the level of the middle phalanx extending to a Brunner's proximal and distal to the PIP and D IP creases.  Tenotomies were used to expose the flexor tendons.  The radial and ulnar digital neurovascular bundles were identified and protected throughout the case.  Meticulous hemostasis was obtained during dissection.  The FDI he tendon was noted to be avulsed off of the distal phalanx and retracted to the level of the PIPJ joint.  A knife was used to create a window at the A3 pulley.  The FDP tendon was dissected out with tenotomies.  Tenolysis knives were used to break through adhesions which had formed circumferentially around the tendon as well as to disrupt the vincula.  The radial and ulnar slips of the FDS tendon were visualized and noted to be intact.  A 2-0 proline was passed in a running fashion through the distal end of the FDS tendon.  Traction was pulled.  Tenolysis knives were used to ensure that there were no adhesions preventing excursion of the tendon.  A 25 gauge needle was used to hold the tendon in place at the level of A2.  A window was made at the A5 pulley.  Cervical dilators were used to dilate the A4 pulley.  A knife was used to make radial and ulnar longitudinal cuts on either side of the volar plate as well as a transverse cut distally.  The volar plate was elevated in a distal to proximal direction.  A [place] was used as a shoe horn to passed the FDP tendon through the A4 pulley.  Approximately 25% of the proximal aspect of A4 was open to facilitate passage of the tendon.  Once the tendon has been passed through a 4, a 25 gauge needle was used to hold it in place.  A knife was used to expose the distal phalanx bed at the insertion site of the FDP tendon.  Using fluoroscopic guidance, two 1.7x5mm Arthrex lucy corkscrew anchors were placed at the insertion site of the FDP tendon.  3-0 FiberWire emanating from the anchors was passed through the tendon in a running fashion and  tied down.  4-0 Vicryl was used to suture the osteo periosteal sleeve to the FDP tendon in the corners.  4-0 Vicryl was passed in a figure-of-eight fashion to secure the FDP tendon to the volar plate both radially and ulnarly.  Tenodesis was evaluated and noted to be appropriate.    Tourniquet was let down. Total tourniquet time was 82 minutes. Hemostasis was obtained.  10 cc of lidocaine 1% without epinephrine mixed with 0.25% Marcaine was injected as local anesthetic.  Incision was closed with 4-0 nylon. Bacitracin, adaptic, 4x4, webril, and a dorsal blocking splint was placed. All sponge, needle, and instrument counts were correct at the conclusion of the procedure.  The patient was awakened and taken to the recovery room in stable condition.    Estimated Blood Loss (EBL):  15 cc           Implants:   Implant Name Type Inv. Item Serial No.  Lot No. LRB No. Used Action   ANCHOR SUT 3-0 FW KWIRE 1.35MM - [id number]  ANCHOR SUT 3-0 FW KWIRE 1.35MM  [id number] Right 1 Implanted   ANCHOR SUT 3-0 FW KWIRE 1.35MM - [id number]  ANCHOR SUT 3-0 FW KWIRE 1.35MM  [employer] [id number] Right 1 Implanted       Specimens:   Specimen (24h ago, onward)      None                    Condition: Good    Disposition: PACU - hemodynamically stable.    Attestation: I was present and scrubbed for the entire procedure.    Lakeisha Nicholson MD  Orthopaedic Hand Surgery

## 2024-10-02 LAB — POCT GLUCOSE: 116 MG/DL (ref 70–110)

## 2024-10-14 DIAGNOSIS — S66.194A OTHER INJURY OF FLEXOR MUSCLE, FASCIA AND TENDON OF RIGHT RING FINGER AT WRIST AND HAND LEVEL, INITIAL ENCOUNTER: Primary | ICD-10-CM

## 2024-10-14 DIAGNOSIS — S63.639A JERSEY FINGER, INITIAL ENCOUNTER: ICD-10-CM

## 2024-10-16 ENCOUNTER — OFFICE VISIT (OUTPATIENT)
Dept: ORTHOPEDICS | Facility: CLINIC | Age: 24
End: 2024-10-16
Payer: MEDICAID

## 2024-10-16 ENCOUNTER — HOSPITAL ENCOUNTER (OUTPATIENT)
Dept: RADIOLOGY | Facility: HOSPITAL | Age: 24
Discharge: HOME | End: 2024-10-16
Attending: STUDENT IN AN ORGANIZED HEALTH CARE EDUCATION/TRAINING PROGRAM
Payer: MEDICAID

## 2024-10-16 VITALS — BODY MASS INDEX: 26.85 KG/M2 | WEIGHT: 171.06 LBS | HEIGHT: 67 IN

## 2024-10-16 DIAGNOSIS — S66.194A OTHER INJURY OF FLEXOR MUSCLE, FASCIA AND TENDON OF RIGHT RING FINGER AT WRIST AND HAND LEVEL, INITIAL ENCOUNTER: ICD-10-CM

## 2024-10-16 DIAGNOSIS — S63.639A JERSEY FINGER, INITIAL ENCOUNTER: ICD-10-CM

## 2024-10-16 DIAGNOSIS — Z98.890 POSTOPERATIVE STATE: Primary | ICD-10-CM

## 2024-10-16 PROCEDURE — 1160F RVW MEDS BY RX/DR IN RCRD: CPT | Mod: CPTII,,, | Performed by: STUDENT IN AN ORGANIZED HEALTH CARE EDUCATION/TRAINING PROGRAM

## 2024-10-16 PROCEDURE — 73130 X-RAY EXAM OF HAND: CPT | Mod: TC,RT

## 2024-10-16 PROCEDURE — 3044F HG A1C LEVEL LT 7.0%: CPT | Mod: CPTII,,, | Performed by: STUDENT IN AN ORGANIZED HEALTH CARE EDUCATION/TRAINING PROGRAM

## 2024-10-16 PROCEDURE — 1159F MED LIST DOCD IN RCRD: CPT | Mod: CPTII,,, | Performed by: STUDENT IN AN ORGANIZED HEALTH CARE EDUCATION/TRAINING PROGRAM

## 2024-10-16 PROCEDURE — 99999 PR PBB SHADOW E&M-EST. PATIENT-LVL III: CPT | Mod: PBBFAC,,, | Performed by: STUDENT IN AN ORGANIZED HEALTH CARE EDUCATION/TRAINING PROGRAM

## 2024-10-16 PROCEDURE — 99024 POSTOP FOLLOW-UP VISIT: CPT | Mod: ,,, | Performed by: STUDENT IN AN ORGANIZED HEALTH CARE EDUCATION/TRAINING PROGRAM

## 2024-10-16 PROCEDURE — 73130 X-RAY EXAM OF HAND: CPT | Mod: 26,RT,, | Performed by: RADIOLOGY

## 2024-10-16 PROCEDURE — 99213 OFFICE O/P EST LOW 20 MIN: CPT | Mod: PBBFAC,25 | Performed by: STUDENT IN AN ORGANIZED HEALTH CARE EDUCATION/TRAINING PROGRAM

## 2024-10-16 NOTE — PROGRESS NOTES
Hand Surgery Clinic Postop Note    Surgery Date: 10/1/24  Surgery: Zone 1 flexor tendon repair right ring finger, tenolysis right ring finger FDP tendon    Postoperative Course:  Patient is doing well.  Pain level is a 2/10.  He has some mild aching in the finger with movement.  He was attended all of his therapy visits following surgery.  He was in a dorsal blocking splint today.  He was kept his finger clean and dry.  He was no fevers.  Mild numbness noted at the tip of the finger.    Vital Signs  There were no vitals filed for this visit.    Physical Exam  General - NAD  Resp - nonlabored breathing  CV - RR by RP    Right Upper Extremity:  Incision is nicely healed.  It was approximated with nylon suture.  No erythema.  No drainage.  Mild generalized swelling throughout the finger.  The finger is warm with brisk capillary refill.  Patient is able to demonstrate active D IP flexion.  Two-point discrimination is 5 mm at the radial and ulnar aspect of the ring finger.  Palpable radial pulse.    Imaging  Right-hand x-rays three views were obtained today and independently reviewed by myself.  To radio opaque anchors are visible in the distal phalanx.  There in the same location as they were during surgery assessed with intraoperative fluoroscopy.  No evidence of loosening or failure.    Assessment and Plan  24-year-old male underwent right ring finger zone 1 flexor tendon repair as well as tenolysis on 10/01/2024, 15 days ago.  He is working with therapy on early active motion.  He has been in a thermoplastic dorsal blocking splint today.  Sutures were removed in clinic today.  Okay to shower.  Discussed the importance that he not straighten the finger as there is a risk that the repair could fail.  Follow up in clinic in 4 weeks.  Obtain x-rays of the right ring finger at that visit.    Lakeisha Nicholson MD  Orthopaedic Hand Surgery

## 2024-11-12 DIAGNOSIS — Z98.890 POSTOPERATIVE STATE: Primary | ICD-10-CM

## 2024-11-13 ENCOUNTER — HOSPITAL ENCOUNTER (OUTPATIENT)
Dept: RADIOLOGY | Facility: HOSPITAL | Age: 24
Discharge: HOME | End: 2024-11-13
Attending: STUDENT IN AN ORGANIZED HEALTH CARE EDUCATION/TRAINING PROGRAM
Payer: MEDICAID

## 2024-11-13 ENCOUNTER — OFFICE VISIT (OUTPATIENT)
Dept: ORTHOPEDICS | Facility: CLINIC | Age: 24
End: 2024-11-13
Payer: MEDICAID

## 2024-11-13 VITALS — BODY MASS INDEX: 26.85 KG/M2 | WEIGHT: 171.06 LBS | HEIGHT: 67 IN

## 2024-11-13 DIAGNOSIS — Z98.890 POSTOPERATIVE STATE: Primary | ICD-10-CM

## 2024-11-13 DIAGNOSIS — Z98.890 POSTOPERATIVE STATE: ICD-10-CM

## 2024-11-13 PROCEDURE — 99999 PR PBB SHADOW E&M-EST. PATIENT-LVL III: CPT | Mod: PBBFAC,,, | Performed by: STUDENT IN AN ORGANIZED HEALTH CARE EDUCATION/TRAINING PROGRAM

## 2024-11-13 PROCEDURE — 73140 X-RAY EXAM OF FINGER(S): CPT | Mod: 26,RT,, | Performed by: RADIOLOGY

## 2024-11-13 PROCEDURE — 99213 OFFICE O/P EST LOW 20 MIN: CPT | Mod: PBBFAC,25 | Performed by: STUDENT IN AN ORGANIZED HEALTH CARE EDUCATION/TRAINING PROGRAM

## 2024-11-13 PROCEDURE — 73140 X-RAY EXAM OF FINGER(S): CPT | Mod: TC,RT

## 2024-11-13 NOTE — PROGRESS NOTES
Hand Surgery Clinic Postop Note    Surgery Date: 10/1/24  Surgery: Zone 1 flexor tendon repair right ring finger, tenolysis right ring finger FDP tendon    Postoperative Course:  Patient is doing well.  He has, for the most part, been attending therapy.  He did .  Attending therapy for a week and a half at one point but then returned.  It was therapy session yesterday, he was told that he could stop using the brace.  He has been working on his range of motion exercises as instructed.  He has no numbness or tingling.  No fevers or chills.  Pain level is a 0/10.    Vital Signs  There were no vitals filed for this visit.    Physical Exam  General - NAD  Resp - nonlabored breathing  CV - RR by RP    Right Upper Extremity:  Incision is well healed.  No erythema.  No drainage.  No swelling.  The ring finger is warm with brisk capillary refill.  Active and passive middle finger MCP motion is 0-100 degrees, PIP motion is 15-95 degrees. DIP motion is 5-25 degrees actively and 5-50 degrees passively.  Palpable radial pulse.    Imaging  Right ring finger x-rays three views were obtained today and independently reviewed by myself.  Anchors are in place in the distal phalanx with out any evidence of loosening or failure.    Assessment and Plan  24-year-old right-hand dominant male presents for follow up.  He underwent zone 1 flexor tendon repair of the right ring finger and tenolysis of the FDP tendon on 10/01/2024, 6 weeks and 1 day ago.  The tendon repair is intact.  Patient is quite stiff at the DIPJ joint.  He is going to continue working with therapy.  Early active motion protocol.  He no longer needs his brace.  He should limit lifting to no greater than 2 lb at this time.  He was going to continue doing his exercises daily.  Follow up in clinic in 6 weeks for re-evaluation with repeat x-rays of the right ring finger.    Lakeisha Nicholson MD  Orthopaedic Hand Surgery

## 2024-12-23 ENCOUNTER — HOSPITAL ENCOUNTER (OUTPATIENT)
Dept: RADIOLOGY | Facility: HOSPITAL | Age: 24
Discharge: HOME | End: 2024-12-23
Attending: STUDENT IN AN ORGANIZED HEALTH CARE EDUCATION/TRAINING PROGRAM
Payer: MEDICAID

## 2024-12-23 ENCOUNTER — OFFICE VISIT (OUTPATIENT)
Dept: ORTHOPEDICS | Facility: CLINIC | Age: 24
End: 2024-12-23
Payer: MEDICAID

## 2024-12-23 VITALS — HEIGHT: 67 IN | WEIGHT: 171.06 LBS | BODY MASS INDEX: 26.85 KG/M2

## 2024-12-23 DIAGNOSIS — Z98.890 POSTOPERATIVE STATE: Primary | ICD-10-CM

## 2024-12-23 DIAGNOSIS — Z98.890 POSTOPERATIVE STATE: ICD-10-CM

## 2024-12-23 PROCEDURE — 99999 PR PBB SHADOW E&M-EST. PATIENT-LVL III: CPT | Mod: PBBFAC,,, | Performed by: STUDENT IN AN ORGANIZED HEALTH CARE EDUCATION/TRAINING PROGRAM

## 2024-12-23 PROCEDURE — 99213 OFFICE O/P EST LOW 20 MIN: CPT | Mod: PBBFAC,25 | Performed by: STUDENT IN AN ORGANIZED HEALTH CARE EDUCATION/TRAINING PROGRAM

## 2024-12-23 PROCEDURE — 73140 X-RAY EXAM OF FINGER(S): CPT | Mod: TC,RT

## 2024-12-23 PROCEDURE — 73140 X-RAY EXAM OF FINGER(S): CPT | Mod: 26,RT,, | Performed by: RADIOLOGY

## 2024-12-23 NOTE — PROGRESS NOTES
Hand Surgery Clinic Postop Note    Surgery Date: 10/1/24  Surgery: Zone 1 flexor tendon repair right ring finger, tenolysis right ring finger FDP tendon    Postoperative Course:  Patient is doing well.  He is 11 weeks and 6 days out from surgery.  He has returned to full activities.  He has been going to his therapy sessions as instructed.  He notes that his motion is little bit improved compared to his last visit.  He has no numbness or tingling.  Pain level is a 0/10.  He does note that his  strength is a little bit weaker compared to the other hand but it is improving.    Vital Signs  There were no vitals filed for this visit.    Physical Exam  General - NAD  Resp - nonlabored breathing  CV - RR by RP    Right Upper Extremity:  Incision is well healed.  No erythema.  No drainage.  No swelling.  The ring finger is warm with brisk capillary refill.  Active and passive middle finger MCP motion is 0-100 degrees, PIP motion is 0-95 degrees. DIP motion is 0-30 degrees actively and 0-50 degrees passively.  Palpable radial pulse.    Imaging  Right ring finger x-rays three views were obtained today and independently reviewed by myself.  Suture anchors are in place without any evidence of loosening or failure.    Assessment and Plan  24-year-old right-hand dominant male underwent zone 1 flexor tendon repair of the right ring finger 11 weeks and 6 days ago, on 10/01/2024.  Doing well.  He can progress activities as tolerated.  He has no restrictions.  He will finish out his therapy sessions to work on improving his  strength.  He is very happy with his outcome.  Follow up in clinic on an as-needed basis.    Lakeisha Nicholson MD  Orthopaedic Hand Surgery

## (undated) DEVICE — SUT CTD VICRYL 4-0 BR PS-2

## (undated) DEVICE — SYR 10CC LUER LOCK

## (undated) DEVICE — APPLICATOR CHLORAPREP ORN 26ML

## (undated) DEVICE — TUBING SUCTION STRAIGHT .25X20

## (undated) DEVICE — SUPPORT ULNA NERVE PROTECTOR

## (undated) DEVICE — DRAPE HAND STERILE

## (undated) DEVICE — MANIFOLD 4 PORT

## (undated) DEVICE — CORD BIPOLAR ELECTROSURGICAL

## (undated) DEVICE — GLOVE SURGEONS ULTRA TOUCH 6.5

## (undated) DEVICE — DRESSING N ADH OIL EMUL 3X8

## (undated) DEVICE — GOWN FAB REINF SET-IN SLV LG

## (undated) DEVICE — COVER LIGHT HANDLE 80/CA

## (undated) DEVICE — DRAPE MINI C ARM STERILE

## (undated) DEVICE — PACK BASIC SETUP SC BR

## (undated) DEVICE — UNDERGLOVES BIOGEL PI SZ 7 LF

## (undated) DEVICE — COVER CAMERA OPERATING ROOM

## (undated) DEVICE — DRAPE U SPLIT SHEET 54X76IN

## (undated) DEVICE — SUT PROLENE 2-0 30 SH

## (undated) DEVICE — TOURNIQUET SB QC DP 18X4IN

## (undated) DEVICE — SUT 4-0 ETHILON 18 PS-2

## (undated) DEVICE — NDL HYPODERMIC SAF 25G 1.5IN

## (undated) DEVICE — ALCOHOL 70% ANTISEPTIC ISO 4OZ

## (undated) DEVICE — POSITIONER HEAD DONUT 9IN FOAM

## (undated) DEVICE — TOWEL OR DISP STRL BLUE 4/PK

## (undated) DEVICE — BANDAGE MATRIX HK LOOP 3IN 5YD

## (undated) DEVICE — DRAPE STERI-DRAPE 1000 17X11IN

## (undated) DEVICE — PLASTER SPLINT FAST 5INX30IN

## (undated) DEVICE — NDL SAFETY 22G X 1.5 ECLIPSE

## (undated) DEVICE — SPONGE GAUZE 4X4 12 PLY STRL

## (undated) DEVICE — KIT TURNOVER